# Patient Record
Sex: FEMALE | Race: WHITE | Employment: UNEMPLOYED | URBAN - METROPOLITAN AREA
[De-identification: names, ages, dates, MRNs, and addresses within clinical notes are randomized per-mention and may not be internally consistent; named-entity substitution may affect disease eponyms.]

---

## 2023-04-18 ENCOUNTER — HOSPITAL ENCOUNTER (EMERGENCY)
Age: 83
Discharge: HOME | End: 2023-04-18
Attending: EMERGENCY MEDICINE
Payer: COMMERCIAL

## 2023-04-18 ENCOUNTER — APPOINTMENT (OUTPATIENT)
Dept: GENERAL RADIOLOGY | Age: 83
End: 2023-04-18
Payer: COMMERCIAL

## 2023-04-18 VITALS
SYSTOLIC BLOOD PRESSURE: 135 MMHG | BODY MASS INDEX: 34.53 KG/M2 | HEIGHT: 67 IN | HEART RATE: 54 BPM | WEIGHT: 220 LBS | RESPIRATION RATE: 16 BRPM | DIASTOLIC BLOOD PRESSURE: 58 MMHG | TEMPERATURE: 98.1 F | OXYGEN SATURATION: 96 %

## 2023-04-18 DIAGNOSIS — M54.50 ACUTE BILATERAL LOW BACK PAIN WITHOUT SCIATICA: Primary | ICD-10-CM

## 2023-04-18 LAB
ABSOLUTE EOS #: 0.14 K/UL (ref 0–0.44)
ABSOLUTE IMMATURE GRANULOCYTE: 0.02 K/UL (ref 0–0.3)
ABSOLUTE LYMPH #: 1.39 K/UL (ref 1.1–3.7)
ABSOLUTE MONO #: 0.47 K/UL (ref 0.1–1.2)
ANION GAP SERPL CALCULATED.3IONS-SCNC: 9 MMOL/L (ref 9–17)
BASOPHILS # BLD: 1 % (ref 0–2)
BASOPHILS ABSOLUTE: 0.07 K/UL (ref 0–0.2)
BILIRUBIN URINE: NEGATIVE
BUN SERPL-MCNC: 14 MG/DL (ref 8–23)
BUN/CREAT BLD: 16 (ref 9–20)
CALCIUM SERPL-MCNC: 9.4 MG/DL (ref 8.6–10.4)
CHLORIDE SERPL-SCNC: 105 MMOL/L (ref 98–107)
CO2 SERPL-SCNC: 25 MMOL/L (ref 20–31)
COLOR: YELLOW
CREAT SERPL-MCNC: 0.89 MG/DL (ref 0.5–0.9)
EOSINOPHILS RELATIVE PERCENT: 2 % (ref 1–4)
EPITHELIAL CELLS UA: NORMAL /HPF (ref 0–5)
GFR SERPL CREATININE-BSD FRML MDRD: >60 ML/MIN/1.73M2
GLUCOSE SERPL-MCNC: 199 MG/DL (ref 70–99)
GLUCOSE UR STRIP.AUTO-MCNC: NEGATIVE MG/DL
HCT VFR BLD AUTO: 43.6 % (ref 36.3–47.1)
HGB BLD-MCNC: 14.1 G/DL (ref 11.9–15.1)
IMMATURE GRANULOCYTES: 0 %
KETONES UR STRIP.AUTO-MCNC: NEGATIVE MG/DL
LEUKOCYTE ESTERASE UR QL STRIP.AUTO: ABNORMAL
LYMPHOCYTES # BLD: 21 % (ref 24–43)
MCH RBC QN AUTO: 31.1 PG (ref 25.2–33.5)
MCHC RBC AUTO-ENTMCNC: 32.3 G/DL (ref 28.4–34.8)
MCV RBC AUTO: 96 FL (ref 82.6–102.9)
MONOCYTES # BLD: 7 % (ref 3–12)
NITRITE UR QL STRIP.AUTO: NEGATIVE
NRBC AUTOMATED: 0 PER 100 WBC
PDW BLD-RTO: 12.8 % (ref 11.8–14.4)
PLATELET # BLD AUTO: 260 K/UL (ref 138–453)
PMV BLD AUTO: 9.5 FL (ref 8.1–13.5)
POTASSIUM SERPL-SCNC: 4.5 MMOL/L (ref 3.7–5.3)
PROT UR STRIP.AUTO-MCNC: 6 MG/DL (ref 5–8)
PROT UR STRIP.AUTO-MCNC: NEGATIVE MG/DL
RBC # BLD: 4.54 M/UL (ref 3.95–5.11)
RBC CLUMPS #/AREA URNS AUTO: NORMAL /HPF (ref 0–2)
SEG NEUTROPHILS: 69 % (ref 36–65)
SEGMENTED NEUTROPHILS ABSOLUTE COUNT: 4.45 K/UL (ref 1.5–8.1)
SODIUM SERPL-SCNC: 139 MMOL/L (ref 135–144)
SPECIFIC GRAVITY UA: 1.02 (ref 1–1.03)
TURBIDITY: ABNORMAL
URINE HGB: NEGATIVE
UROBILINOGEN, URINE: NORMAL
WBC # BLD AUTO: 6.5 K/UL (ref 3.5–11.3)
WBC UA: NORMAL /HPF (ref 0–5)

## 2023-04-18 PROCEDURE — 80048 BASIC METABOLIC PNL TOTAL CA: CPT

## 2023-04-18 PROCEDURE — 85025 COMPLETE CBC W/AUTO DIFF WBC: CPT

## 2023-04-18 PROCEDURE — 6360000002 HC RX W HCPCS: Performed by: EMERGENCY MEDICINE

## 2023-04-18 PROCEDURE — 72100 X-RAY EXAM L-S SPINE 2/3 VWS: CPT

## 2023-04-18 PROCEDURE — 99284 EMERGENCY DEPT VISIT MOD MDM: CPT

## 2023-04-18 PROCEDURE — 81001 URINALYSIS AUTO W/SCOPE: CPT

## 2023-04-18 PROCEDURE — 96375 TX/PRO/DX INJ NEW DRUG ADDON: CPT

## 2023-04-18 PROCEDURE — 96374 THER/PROPH/DIAG INJ IV PUSH: CPT

## 2023-04-18 RX ORDER — METHOCARBAMOL 500 MG/1
500 TABLET, FILM COATED ORAL 4 TIMES DAILY
Qty: 40 TABLET | Refills: 0 | Status: SHIPPED | OUTPATIENT
Start: 2023-04-18 | End: 2023-04-28

## 2023-04-18 RX ORDER — KETOROLAC TROMETHAMINE 30 MG/ML
30 INJECTION, SOLUTION INTRAMUSCULAR; INTRAVENOUS ONCE
Status: COMPLETED | OUTPATIENT
Start: 2023-04-18 | End: 2023-04-18

## 2023-04-18 RX ORDER — MORPHINE SULFATE 2 MG/ML
2 INJECTION, SOLUTION INTRAMUSCULAR; INTRAVENOUS ONCE
Status: COMPLETED | OUTPATIENT
Start: 2023-04-18 | End: 2023-04-18

## 2023-04-18 RX ORDER — TRAMADOL HYDROCHLORIDE 50 MG/1
50 TABLET ORAL EVERY 6 HOURS PRN
Qty: 20 TABLET | Refills: 0 | Status: SHIPPED | OUTPATIENT
Start: 2023-04-18 | End: 2023-04-23

## 2023-04-18 RX ADMIN — MORPHINE SULFATE 2 MG: 2 INJECTION, SOLUTION INTRAMUSCULAR; INTRAVENOUS at 08:24

## 2023-04-18 RX ADMIN — KETOROLAC TROMETHAMINE 30 MG: 30 INJECTION, SOLUTION INTRAMUSCULAR; INTRAVENOUS at 10:19

## 2023-04-18 ASSESSMENT — ENCOUNTER SYMPTOMS
VOMITING: 0
EYE REDNESS: 0
FACIAL SWELLING: 0
SHORTNESS OF BREATH: 0
CONSTIPATION: 0
ABDOMINAL PAIN: 0
BACK PAIN: 1
DIARRHEA: 0
EYE DISCHARGE: 0
COLOR CHANGE: 0
COUGH: 0

## 2023-04-18 ASSESSMENT — PAIN SCALES - GENERAL
PAINLEVEL_OUTOF10: 8
PAINLEVEL_OUTOF10: 9
PAINLEVEL_OUTOF10: 9

## 2023-04-18 ASSESSMENT — PAIN DESCRIPTION - FREQUENCY: FREQUENCY: CONTINUOUS

## 2023-04-18 ASSESSMENT — PAIN DESCRIPTION - LOCATION
LOCATION: BACK

## 2023-04-18 ASSESSMENT — PAIN DESCRIPTION - ORIENTATION
ORIENTATION: LOWER

## 2023-04-18 ASSESSMENT — PAIN DESCRIPTION - DESCRIPTORS
DESCRIPTORS: ACHING;DISCOMFORT;SHARP
DESCRIPTORS: DISCOMFORT;SHARP

## 2023-04-18 ASSESSMENT — PAIN DESCRIPTION - PAIN TYPE: TYPE: INTRACTABLE PAIN

## 2023-04-18 NOTE — ED TRIAGE NOTES
Pt to ED via Medic 23, stretcher to rm 23. Pt to bed per self. Pt c/o lumbar back pain since last night. Pt AOx4. Very tearful and anxious. Denies trauma or fall. Denies numbness, tingling or weakness to extremities. Patent airway, no dyspnea or cyanosis noted.

## 2023-04-18 NOTE — ED NOTES
Pt to bedside commode with assist. Pt tolerated well. Urine collected and sent to lab. Pt returned to bed with assist. Safety maintained.       Son Chacko RN  04/18/23 [id number]

## 2023-04-18 NOTE — ED PROVIDER NOTES
[id number]  [phone]    If symptoms worsen    Doris Lake MD  [address] [place] [address]  [phone]            DISCHARGE MEDICATIONS:     New Prescriptions    METHOCARBAMOL (ROBAXIN) 500 MG TABLET    Take 1 tablet by mouth 4 times daily for 10 days    TRAMADOL (ULTRAM) 50 MG TABLET    Take 1 tablet by mouth every 6 hours as needed for Pain for up to 5 days. Max Daily Amount: 200 mg       The care is provided during an unprecedented national emergency due to the novel coronavirus, COVID-19.     (Please note that portions of this note were completed with a voice recognition program.  Efforts were made to edit the dictations but occasionally words are mis-transcribed.)    Osvaldo Dow MD  Attending Emergency Physician           Osvaldo Dow MD  04/18/23 [id number]

## 2025-05-16 ENCOUNTER — OFFICE VISIT (OUTPATIENT)
Age: 85
End: 2025-05-16

## 2025-05-16 VITALS
TEMPERATURE: 97.8 F | WEIGHT: 213 LBS | OXYGEN SATURATION: 98 % | DIASTOLIC BLOOD PRESSURE: 60 MMHG | RESPIRATION RATE: 16 BRPM | HEIGHT: 66 IN | BODY MASS INDEX: 34.23 KG/M2 | SYSTOLIC BLOOD PRESSURE: 117 MMHG | HEART RATE: 76 BPM

## 2025-05-16 DIAGNOSIS — R30.0 DYSURIA: ICD-10-CM

## 2025-05-16 DIAGNOSIS — N39.0 URINARY TRACT INFECTION WITHOUT HEMATURIA, SITE UNSPECIFIED: Primary | ICD-10-CM

## 2025-05-16 DIAGNOSIS — R35.0 URINARY FREQUENCY: ICD-10-CM

## 2025-05-16 LAB
BILIRUBIN, POC: ABNORMAL
BLOOD URINE, POC: ABNORMAL
CLARITY, POC: CLEAR
COLOR, POC: YELLOW
GLUCOSE URINE, POC: ABNORMAL MG/DL
KETONES, POC: ABNORMAL MG/DL
LEUKOCYTE EST, POC: ABNORMAL
NITRITE, POC: ABNORMAL
PH, POC: 5
PROTEIN, POC: ABNORMAL MG/DL
SPECIFIC GRAVITY, POC: 1.02
UROBILINOGEN, POC: 0.2 MG/DL

## 2025-05-16 RX ORDER — TITANIUM DIOXIDE, OCTINOXATE, ZINC OXIDE 4.61; 1.6; .78 G/40ML; G/40ML; G/40ML
400 CREAM TOPICAL DAILY
COMMUNITY

## 2025-05-16 RX ORDER — SOLIFENACIN SUCCINATE 5 MG/1
5 TABLET, FILM COATED ORAL EVERY MORNING
COMMUNITY

## 2025-05-16 RX ORDER — FLUTICASONE PROPIONATE 50 MCG
SPRAY, SUSPENSION (ML) NASAL
COMMUNITY

## 2025-05-16 RX ORDER — ALPRAZOLAM 0.5 MG
1 TABLET ORAL 3 TIMES DAILY PRN
COMMUNITY
Start: 2025-03-04

## 2025-05-16 RX ORDER — SULFAMETHOXAZOLE AND TRIMETHOPRIM 800; 160 MG/1; MG/1
1 TABLET ORAL 2 TIMES DAILY
Qty: 10 TABLET | Refills: 0 | Status: SHIPPED | OUTPATIENT
Start: 2025-05-16 | End: 2025-05-21

## 2025-05-16 RX ORDER — ATORVASTATIN CALCIUM 20 MG/1
20 TABLET, FILM COATED ORAL DAILY
COMMUNITY
Start: 2025-01-06

## 2025-05-16 RX ORDER — VALACYCLOVIR HYDROCHLORIDE 500 MG/1
500 TABLET, FILM COATED ORAL
COMMUNITY
Start: 2024-08-15 | End: 2025-06-15

## 2025-05-16 RX ORDER — FLUCONAZOLE 200 MG/1
TABLET ORAL
COMMUNITY
Start: 2025-03-13

## 2025-05-16 RX ORDER — ASPIRIN 81 MG/1
TABLET ORAL
COMMUNITY

## 2025-05-16 RX ORDER — INSULIN GLARGINE 100 [IU]/ML
INJECTION, SOLUTION SUBCUTANEOUS
COMMUNITY
Start: 2025-04-19

## 2025-05-16 RX ORDER — LISINOPRIL 5 MG/1
1 TABLET ORAL
COMMUNITY

## 2025-05-16 ASSESSMENT — ENCOUNTER SYMPTOMS
DIARRHEA: 0
COUGH: 0
SHORTNESS OF BREATH: 0
VOMITING: 0
NAUSEA: 0

## 2025-05-16 NOTE — PROGRESS NOTES
[facility], [facility] ([facility])  [facility] URG[facility] [place] [address]  Dept: [phone]    Date: 25    Arlen Mcdonald (:  1940) is a 84 y.o. female, here for evaluation of the following chief complaint(s):  Urinary Tract Infection      HPI  84 y.o. female presents with dysuria and frequency for one day.           ROS  Review of Systems   Constitutional:  Negative for chills, diaphoresis, fatigue and fever.   Respiratory:  Negative for cough and shortness of breath.    Cardiovascular:  Negative for chest pain and palpitations.   Gastrointestinal:  Negative for diarrhea, nausea and vomiting.   Genitourinary:  Positive for dysuria, frequency and urgency. Negative for flank pain and hematuria.   Neurological:  Negative for dizziness, light-headedness and headaches.       PHYSICAL EXAM  Vitals:    25 1422   BP: 117/60   BP Site: Right Wrist   Patient Position: Sitting   BP Cuff Size: Medium Adult   Pulse: 76   Resp: 16   Temp: 97.8 °F (36.6 °C)   TempSrc: Temporal   SpO2: 98%   Weight: 96.6 kg (213 lb)   Height: 1.676 m (5' 6\")     Physical Exam  Vitals and nursing note reviewed.   Constitutional:       General: She is not in acute distress.     Appearance: Normal appearance.   Eyes:      Conjunctiva/sclera: Conjunctivae normal.   Cardiovascular:      Rate and Rhythm: Normal rate.      Pulses: Normal pulses.      Heart sounds: Normal heart sounds.   Pulmonary:      Effort: Pulmonary effort is normal. No respiratory distress.      Breath sounds: Normal breath sounds.   Abdominal:      General: Abdomen is flat. There is no distension.      Palpations: Abdomen is soft.      Tenderness: There is no abdominal tenderness. There is no right CVA tenderness, left CVA tenderness or guarding.   Skin:     General: Skin is warm and dry.   Neurological:      Mental Status: She is alert and oriented to person, place, and time.   Psychiatric:         Mood and Affect:

## 2025-06-07 ENCOUNTER — OFFICE VISIT (OUTPATIENT)
Age: 85
End: 2025-06-07

## 2025-06-07 VITALS
OXYGEN SATURATION: 98 % | HEIGHT: 66 IN | HEART RATE: 60 BPM | BODY MASS INDEX: 34.23 KG/M2 | RESPIRATION RATE: 18 BRPM | SYSTOLIC BLOOD PRESSURE: 129 MMHG | WEIGHT: 213 LBS | DIASTOLIC BLOOD PRESSURE: 79 MMHG | TEMPERATURE: 97.8 F

## 2025-06-07 DIAGNOSIS — N39.0 URINARY TRACT INFECTION WITHOUT HEMATURIA, SITE UNSPECIFIED: Primary | ICD-10-CM

## 2025-06-07 LAB
BILIRUBIN, POC: ABNORMAL
BLOOD URINE, POC: ABNORMAL
CLARITY, POC: CLEAR
COLOR, POC: YELLOW
GLUCOSE URINE, POC: ABNORMAL MG/DL
KETONES, POC: ABNORMAL MG/DL
LEUKOCYTE EST, POC: ABNORMAL
NITRITE, POC: ABNORMAL
PH, POC: 6.5
PROTEIN, POC: ABNORMAL MG/DL
SPECIFIC GRAVITY, POC: 1
UROBILINOGEN, POC: 0.2 MG/DL

## 2025-06-07 RX ORDER — DOXYCYCLINE 100 MG/1
100 CAPSULE ORAL
COMMUNITY
Start: 2025-06-02 | End: 2025-06-09

## 2025-06-07 RX ORDER — CEPHALEXIN 500 MG/1
500 CAPSULE ORAL 3 TIMES DAILY
Qty: 21 CAPSULE | Refills: 0 | Status: SHIPPED | OUTPATIENT
Start: 2025-06-07 | End: 2025-06-14

## 2025-06-07 RX ORDER — PHENAZOPYRIDINE HYDROCHLORIDE 100 MG/1
100 TABLET, FILM COATED ORAL 3 TIMES DAILY PRN
Qty: 9 TABLET | Refills: 0 | Status: SHIPPED | OUTPATIENT
Start: 2025-06-07 | End: 2026-06-07

## 2025-06-07 RX ORDER — FLUCONAZOLE 150 MG/1
150 TABLET ORAL ONCE
Qty: 1 TABLET | Refills: 1 | Status: SHIPPED | OUTPATIENT
Start: 2025-06-07 | End: 2025-06-07

## 2025-06-07 ASSESSMENT — ENCOUNTER SYMPTOMS
BACK PAIN: 0
SHORTNESS OF BREATH: 0
SORE THROAT: 0
VOICE CHANGE: 0
ABDOMINAL PAIN: 0
DIARRHEA: 0
EYE PAIN: 0
VOMITING: 0
CHEST TIGHTNESS: 0
COUGH: 0
COLOR CHANGE: 0
NAUSEA: 0
TROUBLE SWALLOWING: 0
CONSTIPATION: 0
EYE REDNESS: 0

## 2025-06-07 NOTE — PROGRESS NOTES
[facility] [place] [address]  Phone: [phone]  Fax: [phone]      Arlen Mcdonald  1940  MRN: [id number]  Date of visit: 6/7/2025      Chief complaint(s): Urinary Tract Infection (Burning, cold sweats, fever)      History of present illness :     Arlen Mcdonald  is a  very pleasant  84 y.o. female  patient was brought to the urgent care today  for evaluation of possible UTI.  She reports increased frequency and urgency of urination associated with dysuria the last 3 days.  She also reports fever chills and body ache since this morning.  She is currently being treated for a right vulvar lesion by her OB Dr. Pleitez with doxycycline and topical hydrocortisone cream. No sore throat or difficulty swallowing . No earache .  No headache or dizziness . No neck stiffness or pain. No chest pain , shortness of breath or cough . No abdominal or back pain.  No nausea or vomiting.  No change in  bowel movement.  No new neurovascular or motor changes in upper or lower extremities.  No rash.     PAST MEDICAL HISTORY    Past Medical History:   Diagnosis Date    Diabetes mellitus (HCC)     Kidney stone        SURGICAL HISTORY    No past surgical history on file.    CURRENT MEDICATIONS    Current Outpatient Rx   Medication Sig Dispense Refill    doxycycline hyclate (VIBRAMYCIN) 100 MG capsule Take 1 capsule by mouth      cephALEXin (KEFLEX) 500 MG capsule Take 1 capsule by mouth 3 times daily for 7 days 21 capsule 0    phenazopyridine (PYRIDIUM) 100 MG tablet Take 1 tablet by mouth 3 times daily as needed for Pain 9 tablet 0    fluconazole (DIFLUCAN) 150 MG tablet Take 1 tablet by mouth once for 1 dose 1 tablet 1    fluticasone (FLONASE ALLERGY REL CHILDRENS) 50 MCG/ACT nasal spray Flonase      fluconazole (DIFLUCAN) 200 MG tablet TAKE 1 TABLET BY MOUTH 1 TIME NOW      LANTUS SOLOSTAR 100 UNIT/ML injection pen ADMINISTER 32 UNITS UNDER THE SKIN EVERY NIGHT

## 2025-07-14 ENCOUNTER — OFFICE VISIT (OUTPATIENT)
Age: 85
End: 2025-07-14

## 2025-07-14 VITALS
HEIGHT: 66 IN | HEART RATE: 64 BPM | DIASTOLIC BLOOD PRESSURE: 71 MMHG | RESPIRATION RATE: 18 BRPM | OXYGEN SATURATION: 97 % | BODY MASS INDEX: 34.87 KG/M2 | WEIGHT: 217 LBS | SYSTOLIC BLOOD PRESSURE: 119 MMHG

## 2025-07-14 DIAGNOSIS — N30.00 ACUTE CYSTITIS WITHOUT HEMATURIA: Primary | ICD-10-CM

## 2025-07-14 DIAGNOSIS — N34.2 URETHRITIS: ICD-10-CM

## 2025-07-14 LAB
BILIRUBIN, POC: ABNORMAL
BLOOD URINE, POC: ABNORMAL
CLARITY, POC: CLEAR
COLOR, POC: YELLOW
GLUCOSE URINE, POC: ABNORMAL MG/DL
KETONES, POC: ABNORMAL MG/DL
LEUKOCYTE EST, POC: ABNORMAL
NITRITE, POC: ABNORMAL
PH, POC: 6
PROTEIN, POC: ABNORMAL MG/DL
SPECIFIC GRAVITY, POC: 1.01
UROBILINOGEN, POC: 0.2 MG/DL

## 2025-07-14 RX ORDER — SULFAMETHOXAZOLE AND TRIMETHOPRIM 800; 160 MG/1; MG/1
1 TABLET ORAL 2 TIMES DAILY
Qty: 6 TABLET | Refills: 0 | Status: SHIPPED | OUTPATIENT
Start: 2025-07-14 | End: 2025-07-17

## 2025-07-14 ASSESSMENT — ENCOUNTER SYMPTOMS
DIARRHEA: 1
CONSTIPATION: 0
NAUSEA: 0
ABDOMINAL PAIN: 0
EYE DISCHARGE: 0
COUGH: 0
VOMITING: 0
EYE REDNESS: 0
RHINORRHEA: 0
SHORTNESS OF BREATH: 0
SORE THROAT: 0

## 2025-07-14 NOTE — PROGRESS NOTES
[facility], [facility] ([facility])  [facility] [place] [address]  Dept: [phone]    Date: 25    Arlen Mcdonald (:  1940) is a 84 y.o. female, here for evaluation of the following chief complaint(s):  Urinary Tract Infection (Started with chills, frequency and diarrhea, back pain and fatigue. Started about Friday. )      HPI  84 y.o. female presents with body-aches, chills, and diarrhea for one day. Waxing and waning.            ROS  Review of Systems   Constitutional:  Positive for chills. Negative for diaphoresis, fatigue and fever.   HENT:  Negative for congestion, rhinorrhea and sore throat.    Eyes:  Negative for discharge and redness.   Respiratory:  Negative for cough and shortness of breath.    Cardiovascular:  Negative for chest pain and palpitations.   Gastrointestinal:  Positive for diarrhea. Negative for abdominal pain, constipation, nausea and vomiting.   Genitourinary:  Positive for dysuria.   Musculoskeletal:  Positive for myalgias.   Skin:  Negative for rash.   Neurological:  Negative for dizziness, light-headedness and headaches.       PHYSICAL EXAM  Vitals:    25 1150   BP: 119/71   BP Site: Right Upper Arm   Patient Position: Sitting   BP Cuff Size: Large Adult   Pulse: 64   Resp: 18   SpO2: 97%   Weight: 98.4 kg (217 lb)   Height: 1.676 m (5' 6\")     Physical Exam  Constitutional:       General: She is not in acute distress.     Appearance: Normal appearance. She is obese. She is not ill-appearing or toxic-appearing.   Eyes:      Conjunctiva/sclera: Conjunctivae normal.   Cardiovascular:      Rate and Rhythm: Normal rate.      Pulses: Normal pulses.   Pulmonary:      Effort: Pulmonary effort is normal. No respiratory distress.      Breath sounds: Normal breath sounds.   Abdominal:      General: Abdomen is flat. There is no distension.      Palpations: Abdomen is soft.      Tenderness: There is no abdominal tenderness.

## 2025-07-14 NOTE — PATIENT INSTRUCTIONS
Follow-up in three days if no improvement.  Follow-up immediately if the condition worsens.  Maintain good fluid intake.

## 2025-07-19 ENCOUNTER — TELEPHONE (OUTPATIENT)
Age: 85
End: 2025-07-19

## 2025-07-19 NOTE — TELEPHONE ENCOUNTER
Phone call to follow-up with the patient. Spoke with the patient's daughter is her contact. The symptoms have resolved. Instructed to follow-up immediately if the condition returns.    PCR report reviewed on the [facility] website. Still listed as pending.

## 2025-07-21 ENCOUNTER — OFFICE VISIT (OUTPATIENT)
Age: 85
End: 2025-07-21

## 2025-07-21 VITALS
BODY MASS INDEX: 34.87 KG/M2 | WEIGHT: 217 LBS | DIASTOLIC BLOOD PRESSURE: 71 MMHG | HEIGHT: 66 IN | SYSTOLIC BLOOD PRESSURE: 145 MMHG | TEMPERATURE: 97.5 F | RESPIRATION RATE: 16 BRPM | HEART RATE: 72 BPM | OXYGEN SATURATION: 95 %

## 2025-07-21 DIAGNOSIS — M54.50 ACUTE BILATERAL LOW BACK PAIN WITHOUT SCIATICA: Primary | ICD-10-CM

## 2025-07-21 DIAGNOSIS — N39.0 RECURRENT UTI (URINARY TRACT INFECTION): ICD-10-CM

## 2025-07-21 LAB
BILIRUBIN, POC: NEGATIVE
BLOOD URINE, POC: ABNORMAL
CLARITY, POC: ABNORMAL
COLOR, POC: YELLOW
GLUCOSE URINE, POC: NEGATIVE MG/DL
KETONES, POC: ABNORMAL MG/DL
LEUKOCYTE EST, POC: ABNORMAL
NITRITE, POC: NEGATIVE
PH, POC: 5.5
PROTEIN, POC: 100 MG/DL
SPECIFIC GRAVITY, POC: 1.01
UROBILINOGEN, POC: 0.2 MG/DL

## 2025-07-21 ASSESSMENT — ENCOUNTER SYMPTOMS
COUGH: 0
ABDOMINAL PAIN: 0
BACK PAIN: 1
SHORTNESS OF BREATH: 0

## 2025-07-21 NOTE — PROGRESS NOTES
[facility] [place] [address]  Phone: [phone]  Fax: [phone]      Arlen Mcdonald  1940  MRN: [id number]  Date of visit: 2025    Chief Complaint:     Arlen Mcdonald (:  1940) is a 84 y.o. female,New patient, here for evaluation of the following chief complaint(s):  Back Pain      Allergies   Allergen Reactions    Codeine Itching     Other Reaction(s): Notes: Patient states she becomes very agitated.        Current Outpatient Medications   Medication Sig Dispense Refill    fluticasone (FLONASE ALLERGY REL CHILDRENS) 50 MCG/ACT nasal spray Flonase      LANTUS SOLOSTAR 100 UNIT/ML injection pen ADMINISTER 32 UNITS UNDER THE SKIN EVERY NIGHT      ALPRAZolam (XANAX) 0.5 MG tablet Take 1 tablet by mouth 3 times daily as needed.      aspirin 81 MG EC tablet Take by mouth      atorvastatin (LIPITOR) 20 MG tablet Take 1 tablet by mouth daily      vitamin D (CHOLECALCIFEROL) 50 MCG ( UT) CAPS capsule Take 1 capsule by mouth daily      Cranberry (CRANBix) 215 MG CAPS Take 400 mg by mouth daily      lisinopril (PRINIVIL;ZESTRIL) 5 MG tablet Take 1 tablet by mouth Every Day       No current facility-administered medications for this visit.        Past Medical History:   Diagnosis Date    Diabetes mellitus (HCC)     Kidney stone           Subjective/Objective:       Back Pain  This is a recurrent problem. Episode onset: 3 days ago. Pertinent negatives include no abdominal pain, bladder incontinence, bowel incontinence, dysuria, fever, numbness, pelvic pain, tingling or weakness.   Patient presents to clinic accompanied by her daughter with complaints of recurrent back pain. Reports she believes she may have another UTI. Patient was seen 1 week ago and finished a 3 day regimen of Bactrim DS BID. Reports she believed her symptoms improved, however 3-4 days ago she woke up from her sleep and was experiencing bilateral lower

## 2025-07-21 NOTE — PROGRESS NOTES
Arlen Mcdonald (:  1940) is a 84 y.o. female,{New vs Established:[id number]}, here for evaluation of the following chief complaint(s):  Back Pain      Assessment & Plan :  Visit Diagnoses and Associated Orders       Acute bilateral low back pain without sciatica    -  Primary    POCT Urinalysis Dipstick no Micro [14824 Custom]             Recurrent UTI (urinary tract infection)        Urinary Tract Infection ([facility]) [[id number] Custom]                 • Follow up in *** days if symptoms persist or if symptoms worsen.       Subjective :  HPI     84 y.o. female presents with symptoms of ***         Vitals:    25 1756 25 1801   BP: (!) 158/74 (!) 145/71   BP Site: Right Upper Arm    Patient Position: Sitting    BP Cuff Size: Large Adult    Pulse: 72    Resp: 16    Temp: 97.5 °F (36.4 °C)    TempSrc: Temporal    SpO2: 95%    Weight: 98.4 kg (217 lb)    Height: 1.676 m (5' 6\")        Results for orders placed or performed in visit on 25   POCT Urinalysis Dipstick no Micro   Result Value Ref Range    Color, UA yellow     Clarity, UA cloudy     Glucose, UA POC negative mg/dL    Bilirubin, UA negative     Ketones, UA trace mg/dL    Spec Grav, UA 1.015     Blood, UA POC trace-intact     pH, UA 5.5     Protein, UA  mg/dL    Urobilinogen, UA 0.2 mg/dL    Leukocytes, UA small     Nitrite, UA negative          Objective   Physical Exam          An electronic signature was used to authenticate this note.    Erica Archuleta PA-C

## 2025-07-30 ENCOUNTER — FOLLOWUP TELEPHONE ENCOUNTER (OUTPATIENT)
Age: 85
End: 2025-07-30

## 2025-07-30 DIAGNOSIS — B37.31 VAGINAL YEAST INFECTION: Primary | ICD-10-CM

## 2025-07-30 RX ORDER — FLUCONAZOLE 150 MG/1
150 TABLET ORAL
Qty: 2 TABLET | Refills: 0 | OUTPATIENT
Start: 2025-07-30 | End: 2025-08-03

## 2025-07-30 NOTE — TELEPHONE ENCOUNTER
Pt called and updated on findings of send out urine positive for candida. New Rx for diflucan sent to patient pharmacy. Pt and her daughter educated.

## 2025-07-31 ENCOUNTER — PHARMACY VISIT (OUTPATIENT)
Age: 85
End: 2025-07-31

## 2025-07-31 DIAGNOSIS — B37.49 CANDIDIASIS OF GENITALIA: Primary | ICD-10-CM

## 2025-07-31 RX ORDER — FLUCONAZOLE 100 MG/1
TABLET ORAL
Qty: 2 TABLET | Refills: 1 | Status: SHIPPED | OUTPATIENT
Start: 2025-07-31

## 2025-08-17 ENCOUNTER — OFFICE VISIT (OUTPATIENT)
Age: 85
End: 2025-08-17

## 2025-08-17 VITALS
DIASTOLIC BLOOD PRESSURE: 70 MMHG | OXYGEN SATURATION: 97 % | HEIGHT: 66 IN | SYSTOLIC BLOOD PRESSURE: 130 MMHG | RESPIRATION RATE: 18 BRPM | HEART RATE: 67 BPM | BODY MASS INDEX: 34.87 KG/M2 | WEIGHT: 217 LBS

## 2025-08-17 DIAGNOSIS — J06.9 VIRAL UPPER RESPIRATORY TRACT INFECTION: ICD-10-CM

## 2025-08-17 DIAGNOSIS — R39.9 UTI SYMPTOMS: Primary | ICD-10-CM

## 2025-08-17 DIAGNOSIS — N32.81 OVERACTIVE BLADDER: ICD-10-CM

## 2025-08-17 LAB
BILIRUBIN, POC: ABNORMAL
BLOOD URINE, POC: ABNORMAL
CLARITY, POC: CLEAR
COLOR, POC: YELLOW
GLUCOSE URINE, POC: ABNORMAL MG/DL
INFLUENZA A ANTIGEN, POC: NORMAL
INFLUENZA B ANTIGEN, POC: NORMAL
KETONES, POC: ABNORMAL MG/DL
LEUKOCYTE EST, POC: ABNORMAL
Lab: NO
NITRITE, POC: ABNORMAL
PH, POC: 6.5
PROTEIN, POC: ABNORMAL MG/DL
QC PASS/FAIL: NO
SARS-COV-2, POC: NORMAL
SPECIFIC GRAVITY, POC: 1.01
UROBILINOGEN, POC: 0.2 MG/DL

## 2025-08-17 RX ORDER — FLUTICASONE PROPIONATE 50 MCG
1 SPRAY, SUSPENSION (ML) NASAL DAILY
Qty: 32 G | Refills: 1 | Status: SHIPPED | OUTPATIENT
Start: 2025-08-17

## 2025-08-17 RX ORDER — GUAIFENESIN AND DEXTROMETHORPHAN HYDROBROMIDE 600; 30 MG/1; MG/1
1 TABLET, EXTENDED RELEASE ORAL EVERY 12 HOURS PRN
Qty: 20 TABLET | Refills: 0 | Status: SHIPPED | OUTPATIENT
Start: 2025-08-17 | End: 2025-08-27